# Patient Record
Sex: FEMALE | Race: AMERICAN INDIAN OR ALASKA NATIVE | HISPANIC OR LATINO
[De-identification: names, ages, dates, MRNs, and addresses within clinical notes are randomized per-mention and may not be internally consistent; named-entity substitution may affect disease eponyms.]

---

## 2017-03-01 ENCOUNTER — RX ONLY (OUTPATIENT)
Age: 32
Setting detail: RX ONLY
End: 2017-03-01

## 2017-05-08 ENCOUNTER — RX ONLY (OUTPATIENT)
Age: 32
Setting detail: RX ONLY
End: 2017-05-08

## 2020-06-09 ENCOUNTER — APPOINTMENT (RX ONLY)
Dept: URBAN - METROPOLITAN AREA CLINIC 87 | Facility: CLINIC | Age: 35
Setting detail: DERMATOLOGY
End: 2020-06-09

## 2020-06-09 VITALS — HEIGHT: 62 IN | WEIGHT: 293 LBS

## 2020-06-09 DIAGNOSIS — L71.8 OTHER ROSACEA: ICD-10-CM

## 2020-06-09 PROBLEM — L71.9 ROSACEA, UNSPECIFIED: Status: ACTIVE | Noted: 2020-06-09

## 2020-06-09 PROCEDURE — ? COUNSELING

## 2020-06-09 PROCEDURE — ? PRESCRIPTION

## 2020-06-09 PROCEDURE — ? TREATMENT REGIMEN

## 2020-06-09 PROCEDURE — 99202 OFFICE O/P NEW SF 15 MIN: CPT

## 2020-06-09 RX ORDER — IVERMECTIN 10 MG/G
CREAM TOPICAL DAILY
Qty: 1 | Refills: 2 | Status: ERX | COMMUNITY
Start: 2020-06-09

## 2020-06-09 RX ADMIN — IVERMECTIN: 10 CREAM TOPICAL at 00:00

## 2020-06-09 ASSESSMENT — LOCATION DETAILED DESCRIPTION DERM
LOCATION DETAILED: RIGHT INFERIOR CENTRAL MALAR CHEEK
LOCATION DETAILED: LEFT CENTRAL MALAR CHEEK

## 2020-06-09 ASSESSMENT — LOCATION SIMPLE DESCRIPTION DERM
LOCATION SIMPLE: RIGHT CHEEK
LOCATION SIMPLE: LEFT CHEEK

## 2020-06-09 ASSESSMENT — LOCATION ZONE DERM: LOCATION ZONE: FACE

## 2021-02-08 ENCOUNTER — APPOINTMENT (RX ONLY)
Dept: URBAN - METROPOLITAN AREA CLINIC 87 | Facility: CLINIC | Age: 36
Setting detail: DERMATOLOGY
End: 2021-02-08

## 2021-02-08 VITALS — WEIGHT: 205 LBS | TEMPERATURE: 97.3 F | HEIGHT: 62 IN

## 2021-02-08 DIAGNOSIS — L71.8 OTHER ROSACEA: ICD-10-CM

## 2021-02-08 PROCEDURE — ? COUNSELING

## 2021-02-08 PROCEDURE — ? PRESCRIPTION

## 2021-02-08 PROCEDURE — 99213 OFFICE O/P EST LOW 20 MIN: CPT

## 2021-02-08 PROCEDURE — ? TREATMENT REGIMEN

## 2021-02-08 PROCEDURE — ? ADDITIONAL NOTES

## 2021-02-08 RX ORDER — AZELAIC ACID 0.15 G/G
GEL TOPICAL
Qty: 1 | Refills: 0 | Status: ERX | COMMUNITY
Start: 2021-02-08

## 2021-02-08 RX ADMIN — AZELAIC ACID: 0.15 GEL TOPICAL at 00:00

## 2021-02-08 ASSESSMENT — LOCATION SIMPLE DESCRIPTION DERM
LOCATION SIMPLE: INFERIOR FOREHEAD
LOCATION SIMPLE: RIGHT CHEEK
LOCATION SIMPLE: LEFT CHEEK
LOCATION SIMPLE: GLABELLA

## 2021-02-08 ASSESSMENT — LOCATION DETAILED DESCRIPTION DERM
LOCATION DETAILED: INFERIOR MID FOREHEAD
LOCATION DETAILED: GLABELLA
LOCATION DETAILED: RIGHT MEDIAL MALAR CHEEK
LOCATION DETAILED: LEFT MEDIAL MALAR CHEEK

## 2021-02-08 ASSESSMENT — LOCATION ZONE DERM: LOCATION ZONE: FACE

## 2021-02-08 NOTE — PROCEDURE: ADDITIONAL NOTES
Render Risk Assessment In Note?: no
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
Detail Level: Detailed

## 2021-02-08 NOTE — PROCEDURE: TREATMENT REGIMEN
Plan: Informed patient we will start more aggressive treatment (ie low dose isotretinoin, minocycline + Soolantra) once she is done with pregnancy and breastfeeding. She is currently 12 weeks pregnant.
Initiate Treatment: Azelaic acid 15 % topical gel
Detail Level: Zone

## 2021-04-12 ENCOUNTER — APPOINTMENT (RX ONLY)
Dept: URBAN - METROPOLITAN AREA CLINIC 87 | Facility: CLINIC | Age: 36
Setting detail: DERMATOLOGY
End: 2021-04-12

## 2021-04-12 VITALS — TEMPERATURE: 97.8 F | HEIGHT: 62 IN | WEIGHT: 211 LBS

## 2021-04-12 DIAGNOSIS — L71.8 OTHER ROSACEA: ICD-10-CM

## 2021-04-12 PROCEDURE — 99213 OFFICE O/P EST LOW 20 MIN: CPT

## 2021-04-12 PROCEDURE — ? PRESCRIPTION

## 2021-04-12 PROCEDURE — ? COUNSELING

## 2021-04-12 PROCEDURE — ? TREATMENT REGIMEN

## 2021-04-12 PROCEDURE — ? ADDITIONAL NOTES

## 2021-04-12 RX ORDER — AZELAIC ACID 0.15 G/G
GEL TOPICAL
Qty: 1 | Refills: 0 | Status: ERX

## 2021-04-12 RX ORDER — ERYTHROMYCIN 250 MG/1
TABLET, COATED ORAL
Qty: 60 | Refills: 0 | Status: ERX | COMMUNITY
Start: 2021-04-12

## 2021-04-12 RX ORDER — PREDNISONE 5 MG/1
TABLET ORAL
Qty: 11 | Refills: 0 | Status: ERX | COMMUNITY
Start: 2021-04-12

## 2021-04-12 RX ADMIN — PREDNISONE: 5 TABLET ORAL at 00:00

## 2021-04-12 RX ADMIN — ERYTHROMYCIN: 250 TABLET, COATED ORAL at 00:00

## 2021-04-12 ASSESSMENT — LOCATION SIMPLE DESCRIPTION DERM
LOCATION SIMPLE: RIGHT CHEEK
LOCATION SIMPLE: GLABELLA
LOCATION SIMPLE: INFERIOR FOREHEAD
LOCATION SIMPLE: LEFT CHEEK

## 2021-04-12 ASSESSMENT — LOCATION ZONE DERM: LOCATION ZONE: FACE

## 2021-04-12 ASSESSMENT — LOCATION DETAILED DESCRIPTION DERM
LOCATION DETAILED: RIGHT MEDIAL MALAR CHEEK
LOCATION DETAILED: INFERIOR MID FOREHEAD
LOCATION DETAILED: GLABELLA
LOCATION DETAILED: LEFT MEDIAL MALAR CHEEK

## 2021-04-12 NOTE — PROCEDURE: TREATMENT REGIMEN
Initiate Treatment: Erythromycin 250 mg tablet + Prednisone 5 mg tablet
Plan: Informed patient we will start more aggressive treatment (ie low dose isotretinoin, minocycline + Soolantra) once she is done with pregnancy and breastfeeding. She is currently 5 months pregnant.
Detail Level: Zone
Continue Regimen: Azelaic acid 15 % topical gel

## 2021-04-12 NOTE — PROCEDURE: ADDITIONAL NOTES
Additional Notes: Because her presentation is still borderline pyoderma faciale despite topical therapy; will start short course of oral prednisone with oral erythromycin. Discussed that she should have written clearance from her OBGYN before picking up medication and starting.
Render Risk Assessment In Note?: no
Detail Level: Detailed

## 2022-07-07 ENCOUNTER — APPOINTMENT (RX ONLY)
Dept: URBAN - METROPOLITAN AREA CLINIC 87 | Facility: CLINIC | Age: 37
Setting detail: DERMATOLOGY
End: 2022-07-07

## 2022-07-07 VITALS — WEIGHT: 215 LBS | HEIGHT: 62 IN | TEMPERATURE: 97.8 F

## 2022-07-07 DIAGNOSIS — L21.8 OTHER SEBORRHEIC DERMATITIS: ICD-10-CM

## 2022-07-07 DIAGNOSIS — L71.8 OTHER ROSACEA: ICD-10-CM

## 2022-07-07 PROCEDURE — ? PRESCRIPTION

## 2022-07-07 PROCEDURE — ? TREATMENT REGIMEN

## 2022-07-07 PROCEDURE — 99214 OFFICE O/P EST MOD 30 MIN: CPT

## 2022-07-07 PROCEDURE — ? COUNSELING

## 2022-07-07 RX ORDER — FLUOCINONIDE 0.5 MG/ML
SOLUTION TOPICAL
Qty: 60 | Refills: 1 | Status: ERX | COMMUNITY
Start: 2022-07-07

## 2022-07-07 RX ORDER — CICLOPIROX 10 MG/.96ML
SHAMPOO TOPICAL
Qty: 120 | Refills: 5 | Status: ERX | COMMUNITY
Start: 2022-07-07

## 2022-07-07 RX ORDER — IVERMECTIN 10 MG/G
CREAM TOPICAL
Qty: 45 | Refills: 2 | Status: ERX | COMMUNITY
Start: 2022-07-07

## 2022-07-07 RX ORDER — MINOCYCLINE HYDROCHLORIDE 90 MG/1
CAPSULE, EXTENDED RELEASE ORAL
Qty: 30 | Refills: 0 | Status: ERX | COMMUNITY
Start: 2022-07-07

## 2022-07-07 RX ADMIN — CICLOPIROX: 10 SHAMPOO TOPICAL at 00:00

## 2022-07-07 RX ADMIN — IVERMECTIN: 10 CREAM TOPICAL at 00:00

## 2022-07-07 RX ADMIN — MINOCYCLINE HYDROCHLORIDE: 90 CAPSULE, EXTENDED RELEASE ORAL at 00:00

## 2022-07-07 RX ADMIN — FLUOCINONIDE: 0.5 SOLUTION TOPICAL at 00:00

## 2022-07-07 ASSESSMENT — LOCATION DETAILED DESCRIPTION DERM
LOCATION DETAILED: LEFT SUPERIOR PARIETAL SCALP
LOCATION DETAILED: RIGHT MEDIAL MALAR CHEEK
LOCATION DETAILED: INFERIOR MID FOREHEAD
LOCATION DETAILED: GLABELLA
LOCATION DETAILED: LEFT MEDIAL MALAR CHEEK

## 2022-07-07 ASSESSMENT — LOCATION SIMPLE DESCRIPTION DERM
LOCATION SIMPLE: INFERIOR FOREHEAD
LOCATION SIMPLE: LEFT CHEEK
LOCATION SIMPLE: SCALP
LOCATION SIMPLE: RIGHT CHEEK
LOCATION SIMPLE: GLABELLA

## 2022-07-07 ASSESSMENT — LOCATION ZONE DERM
LOCATION ZONE: FACE
LOCATION ZONE: SCALP

## 2022-07-07 NOTE — PROCEDURE: TREATMENT REGIMEN
Discontinue Regimen: Erythromycin 250 mg tablet/Prednisone 5 mg tablet/Azeleic acid (not effective)
Initiate Treatment: Ximino 90 mg capsule, extended release + Soolantra 1 % topical cream
Plan: Will start more aggressive treatment now that patient no longer pregnant/breastfeeding
Samples Given: [place] ro[place] posay tinted sunscreen (2) + Ximino 90mg QD (3)
Detail Level: Zone
Initiate Treatment: Fluocinonide 0.05 % topical solution + Loprox 1 % shampoo

## 2022-08-11 ENCOUNTER — APPOINTMENT (RX ONLY)
Dept: URBAN - METROPOLITAN AREA CLINIC 87 | Facility: CLINIC | Age: 37
Setting detail: DERMATOLOGY
End: 2022-08-11

## 2022-08-11 VITALS — HEIGHT: 62 IN | WEIGHT: 215 LBS

## 2022-08-11 DIAGNOSIS — L50.1 IDIOPATHIC URTICARIA: ICD-10-CM

## 2022-08-11 DIAGNOSIS — L71.8 OTHER ROSACEA: ICD-10-CM | Status: IMPROVED

## 2022-08-11 PROCEDURE — 99213 OFFICE O/P EST LOW 20 MIN: CPT

## 2022-08-11 PROCEDURE — ? ADDITIONAL NOTES

## 2022-08-11 PROCEDURE — ? COUNSELING

## 2022-08-11 PROCEDURE — ? TREATMENT REGIMEN

## 2022-08-11 PROCEDURE — ? PRESCRIPTION

## 2022-08-11 RX ORDER — MINOCYCLINE HYDROCHLORIDE 45 MG/1
CAPSULE, EXTENDED RELEASE ORAL
Qty: 30 | Refills: 1 | Status: ERX | COMMUNITY
Start: 2022-08-11

## 2022-08-11 RX ORDER — IVERMECTIN 10 MG/G
CREAM TOPICAL
Qty: 45 | Refills: 1 | Status: ERX

## 2022-08-11 RX ORDER — BENZOYL PEROXIDE 50 MG/G
CREAM TOPICAL
Qty: 30 | Refills: 1 | Status: ERX | COMMUNITY
Start: 2022-08-11

## 2022-08-11 RX ADMIN — MINOCYCLINE HYDROCHLORIDE: 45 CAPSULE, EXTENDED RELEASE ORAL at 00:00

## 2022-08-11 RX ADMIN — BENZOYL PEROXIDE: 50 CREAM TOPICAL at 00:00

## 2022-08-11 ASSESSMENT — LOCATION ZONE DERM
LOCATION ZONE: ARM
LOCATION ZONE: FACE

## 2022-08-11 ASSESSMENT — LOCATION DETAILED DESCRIPTION DERM
LOCATION DETAILED: GLABELLA
LOCATION DETAILED: RIGHT VENTRAL PROXIMAL FOREARM
LOCATION DETAILED: RIGHT MEDIAL MALAR CHEEK
LOCATION DETAILED: LEFT VENTRAL PROXIMAL FOREARM
LOCATION DETAILED: LEFT MEDIAL MALAR CHEEK
LOCATION DETAILED: INFERIOR MID FOREHEAD

## 2022-08-11 ASSESSMENT — LOCATION SIMPLE DESCRIPTION DERM
LOCATION SIMPLE: LEFT CHEEK
LOCATION SIMPLE: RIGHT CHEEK
LOCATION SIMPLE: LEFT FOREARM
LOCATION SIMPLE: GLABELLA
LOCATION SIMPLE: INFERIOR FOREHEAD
LOCATION SIMPLE: RIGHT FOREARM

## 2022-08-11 NOTE — PROCEDURE: TREATMENT REGIMEN
Discontinue Regimen: Erythromycin 250 mg tablet/Prednisone 5 mg tablet/Azeleic acid (not effective) + Ximino 90 mg capsule (decreasing)
Initiate Treatment: Epsolay 5 % topical cream QAM
Modify Regimen: Decrease to Ximino 45 mg capsule, extended release
Plan: Patient no longer pregnant/breastfeeding
Samples Given: Ximino 45mg QD (3) + Epsolay 5 % topical cream QAM (1)
Detail Level: Zone
Continue Regimen: Soolantra 1 % topical cream QHS
Otc Regimen: Antihistamines + Cortisone

## 2022-08-11 NOTE — PROCEDURE: ADDITIONAL NOTES
Render Risk Assessment In Note?: no
Detail Level: Simple
Additional Notes: Pt had pictures on phone when rash was active.

## 2022-11-11 ENCOUNTER — APPOINTMENT (RX ONLY)
Dept: URBAN - METROPOLITAN AREA CLINIC 87 | Facility: CLINIC | Age: 37
Setting detail: DERMATOLOGY
End: 2022-11-11

## 2022-11-11 DIAGNOSIS — L81.6 OTHER DISORDERS OF DIMINISHED MELANIN FORMATION: ICD-10-CM

## 2022-11-11 DIAGNOSIS — L71.8 OTHER ROSACEA: ICD-10-CM

## 2022-11-11 PROCEDURE — ? COUNSELING

## 2022-11-11 PROCEDURE — ? TREATMENT REGIMEN

## 2022-11-11 PROCEDURE — ? PRESCRIPTION

## 2022-11-11 PROCEDURE — 99213 OFFICE O/P EST LOW 20 MIN: CPT

## 2022-11-11 RX ORDER — DOXYCYCLINE 40 MG/1
CAPSULE ORAL
Qty: 30 | Refills: 5 | Status: ERX | COMMUNITY
Start: 2022-11-11

## 2022-11-11 RX ORDER — BENZOYL PEROXIDE 50 MG/G
CREAM TOPICAL
Qty: 30 | Refills: 1 | Status: ERX

## 2022-11-11 RX ORDER — IVERMECTIN 10 MG/G
CREAM TOPICAL
Qty: 45 | Refills: 1 | Status: ERX

## 2022-11-11 RX ORDER — TACROLIMUS 1 MG/G
OINTMENT TOPICAL
Qty: 100 | Refills: 0 | Status: ERX | COMMUNITY
Start: 2022-11-11

## 2022-11-11 RX ORDER — MINOCYCLINE HYDROCHLORIDE 45 MG/1
CAPSULE, EXTENDED RELEASE ORAL
Qty: 30 | Refills: 0 | Status: ERX

## 2022-11-11 RX ADMIN — TACROLIMUS: 1 OINTMENT TOPICAL at 00:00

## 2022-11-11 RX ADMIN — DOXYCYCLINE: 40 CAPSULE ORAL at 00:00

## 2022-11-11 ASSESSMENT — LOCATION DETAILED DESCRIPTION DERM
LOCATION DETAILED: RIGHT MEDIAL MALAR CHEEK
LOCATION DETAILED: LEFT DISTAL DORSAL FOREARM
LOCATION DETAILED: INFERIOR MID FOREHEAD
LOCATION DETAILED: GLABELLA
LOCATION DETAILED: LEFT MEDIAL MALAR CHEEK
LOCATION DETAILED: RIGHT DISTAL DORSAL FOREARM

## 2022-11-11 ASSESSMENT — LOCATION SIMPLE DESCRIPTION DERM
LOCATION SIMPLE: RIGHT CHEEK
LOCATION SIMPLE: RIGHT FOREARM
LOCATION SIMPLE: INFERIOR FOREHEAD
LOCATION SIMPLE: LEFT CHEEK
LOCATION SIMPLE: LEFT FOREARM
LOCATION SIMPLE: GLABELLA

## 2022-11-11 ASSESSMENT — LOCATION ZONE DERM
LOCATION ZONE: FACE
LOCATION ZONE: ARM

## 2022-11-11 NOTE — PROCEDURE: TREATMENT REGIMEN
Initiate Treatment: Oracea (see below)
Modify Regimen: Decrease Ximino to every other day then switch to Oracea qd
Plan: Rtc 6 months for reevaluation
Detail Level: Zone
Continue Regimen: Soolantra 1 % topical cream QHS + Epsolay 5 % topical cream QAM
Initiate Treatment: Tacrolimus ointment

## 2023-11-06 ENCOUNTER — APPOINTMENT (RX ONLY)
Dept: URBAN - METROPOLITAN AREA CLINIC 86 | Facility: CLINIC | Age: 38
Setting detail: DERMATOLOGY
End: 2023-11-06

## 2023-11-06 VITALS — WEIGHT: 193 LBS | HEIGHT: 62 IN

## 2023-11-06 DIAGNOSIS — L71.8 OTHER ROSACEA: ICD-10-CM

## 2023-11-06 DIAGNOSIS — L81.9 DISORDER OF PIGMENTATION, UNSPECIFIED: ICD-10-CM

## 2023-11-06 DIAGNOSIS — L81.6 OTHER DISORDERS OF DIMINISHED MELANIN FORMATION: ICD-10-CM

## 2023-11-06 PROCEDURE — ? PRESCRIPTION

## 2023-11-06 PROCEDURE — 99214 OFFICE O/P EST MOD 30 MIN: CPT

## 2023-11-06 PROCEDURE — ? PHOTO-DOCUMENTATION

## 2023-11-06 PROCEDURE — ? TREATMENT REGIMEN

## 2023-11-06 PROCEDURE — ? COUNSELING

## 2023-11-06 RX ORDER — IVERMECTIN 10 MG/G
CREAM TOPICAL
Qty: 45 | Refills: 11 | Status: ERX

## 2023-11-06 RX ORDER — TACROLIMUS 1 MG/G
OINTMENT TOPICAL
Qty: 100 | Refills: 0 | Status: ERX

## 2023-11-06 RX ORDER — BENZOYL PEROXIDE 50 MG/G
CREAM TOPICAL
Qty: 30 | Refills: 11 | Status: ERX

## 2023-11-06 ASSESSMENT — LOCATION DETAILED DESCRIPTION DERM
LOCATION DETAILED: RIGHT ANTERIOR MEDIAL PROXIMAL UPPER ARM
LOCATION DETAILED: LEFT ANTERIOR MEDIAL PROXIMAL UPPER ARM
LOCATION DETAILED: LEFT DISTAL DORSAL FOREARM
LOCATION DETAILED: RIGHT DISTAL DORSAL FOREARM
LOCATION DETAILED: LEFT CENTRAL BUCCAL CHEEK
LOCATION DETAILED: RIGHT LOWER CUTANEOUS LIP

## 2023-11-06 ASSESSMENT — LOCATION SIMPLE DESCRIPTION DERM
LOCATION SIMPLE: RIGHT UPPER ARM
LOCATION SIMPLE: LEFT UPPER ARM
LOCATION SIMPLE: LEFT FOREARM
LOCATION SIMPLE: RIGHT FOREARM
LOCATION SIMPLE: RIGHT LIP
LOCATION SIMPLE: LEFT CHEEK

## 2023-11-06 ASSESSMENT — LOCATION ZONE DERM
LOCATION ZONE: FACE
LOCATION ZONE: ARM
LOCATION ZONE: LIP

## 2023-11-06 NOTE — PROCEDURE: TREATMENT REGIMEN
Initiate Treatment: Re-start Epsolay 5 % topical cream QAM (current flare likely due to patient running out)
Plan: Rosacea is well controlled with topicals only. Pt has Oracea and will take PRN. Pt is aware that she will need to stop oral medications and Soolantra if she is TTC (states might start trying in 2024)
Samples Given: Epsolay (2)
Detail Level: Zone
Continue Regimen: Soolantra 1 % topical cream QHS + Oracea PRN (pt has enough at home)
Plan: If no improvement in hypopigmented areas with Tacrolimus discussed the possibility of doing a punch biopsy
Initiate Treatment: Tacrolimus ointment
Initiate Treatment: Tacrolimus 0.1 % ointment

## 2024-11-07 ENCOUNTER — APPOINTMENT (RX ONLY)
Dept: URBAN - METROPOLITAN AREA CLINIC 86 | Facility: CLINIC | Age: 39
Setting detail: DERMATOLOGY
End: 2024-11-07

## 2024-11-07 VITALS — WEIGHT: 218 LBS | HEIGHT: 62 IN

## 2024-11-07 DIAGNOSIS — L71.8 OTHER ROSACEA: ICD-10-CM

## 2024-11-07 PROCEDURE — ? PRESCRIPTION

## 2024-11-07 PROCEDURE — ? PHOTO-DOCUMENTATION

## 2024-11-07 PROCEDURE — 99214 OFFICE O/P EST MOD 30 MIN: CPT

## 2024-11-07 PROCEDURE — ? COUNSELING

## 2024-11-07 PROCEDURE — ? TREATMENT REGIMEN

## 2024-11-07 RX ORDER — DOXYCYCLINE 100 MG/1
TABLET, FILM COATED ORAL
Qty: 30 | Refills: 0 | Status: ERX | COMMUNITY
Start: 2024-11-07

## 2024-11-07 RX ORDER — AZELAIC ACID 0.15 G/G
GEL TOPICAL
Qty: 50 | Refills: 5 | Status: ERX | COMMUNITY
Start: 2024-11-07

## 2024-11-07 RX ORDER — IVERMECTIN 10 MG/G
CREAM TOPICAL
Qty: 45 | Refills: 11 | Status: ERX

## 2024-11-07 RX ORDER — BENZOYL PEROXIDE 50 MG/G
CREAM TOPICAL
Qty: 30 | Refills: 11 | Status: ERX

## 2024-11-07 RX ORDER — DOXYCYCLINE 40 MG/1
CAPSULE ORAL
Qty: 90 | Refills: 1 | Status: ERX

## 2024-11-07 RX ADMIN — AZELAIC ACID: 0.15 GEL TOPICAL at 00:00

## 2024-11-07 RX ADMIN — DOXYCYCLINE: 100 TABLET, FILM COATED ORAL at 00:00

## 2024-11-07 ASSESSMENT — LOCATION ZONE DERM
LOCATION ZONE: FACE
LOCATION ZONE: LIP

## 2024-11-07 ASSESSMENT — LOCATION SIMPLE DESCRIPTION DERM
LOCATION SIMPLE: LEFT CHEEK
LOCATION SIMPLE: RIGHT LIP

## 2024-11-07 ASSESSMENT — LOCATION DETAILED DESCRIPTION DERM
LOCATION DETAILED: LEFT CENTRAL BUCCAL CHEEK
LOCATION DETAILED: RIGHT LOWER CUTANEOUS LIP

## 2024-11-07 NOTE — PROCEDURE: TREATMENT REGIMEN
Initiate Treatment: doxycycline monohydrate 100 mg tablet QD x 1 month + azelaic acid 15 % topical gel QD (if patient becomes pregnant)
Plan: Patient has experienced active flares since June after a miscarriage. She had stopped her rx in April. Patient is not actively family planning, so she is open to discussing oral/topical treatment. (Plan to start Amoxicillin vs azithromycin + azelaic acid if patient becomes pregnant). She understands her current regimen of doxy, epsolay and soolantra cannot be used in pregnancy
Detail Level: Zone
Continue Regimen: Restart: Epsolay 5 % topical cream QAM + Soolantra 1 % topical cream QHS + Oracea 40 mg capsule,immediate - delay release QD, after completing course of doxycycline